# Patient Record
Sex: MALE | Race: WHITE | NOT HISPANIC OR LATINO | Employment: FULL TIME | URBAN - METROPOLITAN AREA
[De-identification: names, ages, dates, MRNs, and addresses within clinical notes are randomized per-mention and may not be internally consistent; named-entity substitution may affect disease eponyms.]

---

## 2022-03-11 ENCOUNTER — OFFICE VISIT (OUTPATIENT)
Dept: OTOLARYNGOLOGY | Facility: CLINIC | Age: 67
End: 2022-03-11
Payer: COMMERCIAL

## 2022-03-11 VITALS
DIASTOLIC BLOOD PRESSURE: 85 MMHG | SYSTOLIC BLOOD PRESSURE: 166 MMHG | OXYGEN SATURATION: 97 % | HEART RATE: 83 BPM | RESPIRATION RATE: 16 BRPM

## 2022-03-11 DIAGNOSIS — H65.93 OME (OTITIS MEDIA WITH EFFUSION), BILATERAL: Primary | ICD-10-CM

## 2022-03-11 PROCEDURE — 99203 OFFICE O/P NEW LOW 30 MIN: CPT | Performed by: OTOLARYNGOLOGY

## 2022-03-11 RX ORDER — AMLODIPINE BESYLATE 10 MG/1
TABLET ORAL
COMMUNITY
Start: 2022-03-08

## 2022-03-11 RX ORDER — PREDNISONE 20 MG/1
TABLET ORAL
Qty: 16 TABLET | Refills: 0 | Status: SHIPPED | OUTPATIENT
Start: 2022-03-11

## 2022-03-11 RX ORDER — FLUTICASONE PROPIONATE 50 MCG
SPRAY, SUSPENSION (ML) NASAL
COMMUNITY
Start: 2022-02-06

## 2022-03-11 RX ORDER — ALLOPURINOL 100 MG/1
TABLET ORAL
COMMUNITY
Start: 2022-02-19

## 2022-03-11 RX ORDER — VALSARTAN 320 MG/1
TABLET ORAL
COMMUNITY
Start: 2021-12-17

## 2022-03-11 NOTE — LETTER
3/11/2022         RE: Fei Gonzales  [address] [place] [address]        Dear Colleague,    Thank you for referring your patient, Fei Gonzales, to the [facility]. Please see a copy of my visit note below.    Chief Complaint - Hearing loss    History of Present Illness - Fei Gonzales is a 66 year old male who presents to me today with hearing loss or a plugged feeling in both ears, left is worse. He has worse tinnitus. This came on suddenly with an upper respiratory infection. Sometimes his ears hurt. No drainage.  It has been present and noticeable for approximately since late January 2022. There is no history of chronic ear disease or ear surgery. The patient denies vertigo. The patient has tried flonase, but these things have not helped.     Past Medical History -   -HTN    Current Medications -   Current Outpatient Medications:      allopurinol (ZYLOPRIM) 100 MG tablet, , Disp: , Rfl:      amLODIPine (NORVASC) 10 MG tablet, , Disp: , Rfl:      fluticasone (FLONASE) 50 MCG/ACT nasal spray, Instill 2 sprays into EACH nare ONCE DAILY., Disp: , Rfl:      omeprazole (PRILOSEC) 20 MG DR capsule, Take 20 mg by mouth, Disp: , Rfl:      valsartan (DIOVAN) 320 MG tablet, , Disp: , Rfl:     Allergies - No Known Allergies    Social History -   Social History     Socioeconomic History     Marital status:      Spouse name: Not on file     Number of children: Not on file     Years of education: Not on file     Highest education level: Not on file   Occupational History     Not on file   Tobacco Use     Smoking status: Not on file     Smokeless tobacco: Not on file   Substance and Sexual Activity     Alcohol use: Not on file     Drug use: Not on file     Sexual activity: Not on file   Other Topics Concern     Not on file   Social History Narrative     Not on file     Social Determinants of Health     Financial Resource Strain: Not on file   Food Insecurity: Not on  file   Transportation Needs: Not on file   Physical Activity: Not on file   Stress: Not on file   Social Connections: Not on file   Intimate Partner Violence: Not on file   Housing Stability: Not on file       Physical Exam  General - The patient is nontoxic, in no distress.  Alert and oriented to person and place, answers questions and cooperates with examination appropriately.   Voice and Breathing - The patient was breathing comfortably without the use of accessory muscles. There was no wheezing, stridor, or stertor.  The patients voice was clear and strong.  Ears - clean canals. The tympanic membrane on the right is intact, but maybe retracted some. No significant middle ear effusion. No acute infection.  No fluid or purulence was seen in the external canal. The tympanic membrane on the left is intact, some retraction, but no middle ear effusion. No acute infection.  No fluid or purulence was seen in the external canal.   Nose - Septum midline. Turbinates normal size. Airway patent bilaterally. No polyps, masses, or pus.   Eyes - Extraocular movements intact. Sclera were not icteric or injected.  Nose - septum midline, no polyps or pus. Airway open.   Neck - Soft, non-tender. Palpation of the occipital, submental, submandibular, internal jugular chain, and supraclavicular nodes did not demonstrate any abnormal lymph nodes or masses. No parotid masses. Palpation of the thyroid was soft and smooth, with no nodules or goiter appreciated.  The trachea was mobile and midline.  Neurological - Cranial nerves 2 through 12 were grossly intact. House-Brackmann grade 1 out of 6 bilaterally.      Assessment and Plan - Fei Berlin Gonzales is a 66 year old male who presents to me today with hearing loss.  This is most consistent with ETD and possibly middle ear/mastoid effusion and/or inflammation. This is likely due to transient eustachian tube dysfunction following an upper respiratory infection. I have advised trying  prednisone and daily valsalva to try and improve the eustachian tube function. The patient will return in a month with audiogram if this doesn't improve.    William Lopez MD  Otolaryngology  [facility]        Again, thank you for allowing me to participate in the care of your patient.        Sincerely,        William Lopez MD

## 2022-03-11 NOTE — PROGRESS NOTES
Chief Complaint - Hearing loss    History of Present Illness - Fei Gonzales is a 66 year old male who presents to me today with hearing loss or a plugged feeling in both ears, left is worse. He has worse tinnitus. This came on suddenly with an upper respiratory infection. Sometimes his ears hurt. No drainage.  It has been present and noticeable for approximately since late January 2022. There is no history of chronic ear disease or ear surgery. The patient denies vertigo. The patient has tried flonase, but these things have not helped.     Past Medical History -   -HTN    Current Medications -   Current Outpatient Medications:      allopurinol (ZYLOPRIM) 100 MG tablet, , Disp: , Rfl:      amLODIPine (NORVASC) 10 MG tablet, , Disp: , Rfl:      fluticasone (FLONASE) 50 MCG/ACT nasal spray, Instill 2 sprays into EACH nare ONCE DAILY., Disp: , Rfl:      omeprazole (PRILOSEC) 20 MG DR capsule, Take 20 mg by mouth, Disp: , Rfl:      valsartan (DIOVAN) 320 MG tablet, , Disp: , Rfl:     Allergies - No Known Allergies    Social History -   Social History     Socioeconomic History     Marital status:      Spouse name: Not on file     Number of children: Not on file     Years of education: Not on file     Highest education level: Not on file   Occupational History     Not on file   Tobacco Use     Smoking status: Not on file     Smokeless tobacco: Not on file   Substance and Sexual Activity     Alcohol use: Not on file     Drug use: Not on file     Sexual activity: Not on file   Other Topics Concern     Not on file   Social History Narrative     Not on file     Social Determinants of Health     Financial Resource Strain: Not on file   Food Insecurity: Not on file   Transportation Needs: Not on file   Physical Activity: Not on file   Stress: Not on file   Social Connections: Not on file   Intimate Partner Violence: Not on file   Housing Stability: Not on file       Physical Exam  General - The patient is  nontoxic, in no distress.  Alert and oriented to person and place, answers questions and cooperates with examination appropriately.   Voice and Breathing - The patient was breathing comfortably without the use of accessory muscles. There was no wheezing, stridor, or stertor.  The patients voice was clear and strong.  Ears - clean canals. The tympanic membrane on the right is intact, but maybe retracted some. No significant middle ear effusion. No acute infection.  No fluid or purulence was seen in the external canal. The tympanic membrane on the left is intact, some retraction, but no middle ear effusion. No acute infection.  No fluid or purulence was seen in the external canal.   Nose - Septum midline. Turbinates normal size. Airway patent bilaterally. No polyps, masses, or pus.   Eyes - Extraocular movements intact. Sclera were not icteric or injected.  Nose - septum midline, no polyps or pus. Airway open.   Neck - Soft, non-tender. Palpation of the occipital, submental, submandibular, internal jugular chain, and supraclavicular nodes did not demonstrate any abnormal lymph nodes or masses. No parotid masses. Palpation of the thyroid was soft and smooth, with no nodules or goiter appreciated.  The trachea was mobile and midline.  Neurological - Cranial nerves 2 through 12 were grossly intact. House-Brackmann grade 1 out of 6 bilaterally.      Assessment and Plan - Fei Gonzales is a 66 year old male who presents to me today with hearing loss.  This is most consistent with ETD and possibly middle ear/mastoid effusion and/or inflammation. This is likely due to transient eustachian tube dysfunction following an upper respiratory infection. I have advised trying prednisone and daily valsalva to try and improve the eustachian tube function. The patient will return in a month with audiogram if this doesn't improve.    William Lopez MD  Otolaryngology  [facility]